# Patient Record
Sex: MALE | Race: WHITE | Employment: FULL TIME | URBAN - METROPOLITAN AREA
[De-identification: names, ages, dates, MRNs, and addresses within clinical notes are randomized per-mention and may not be internally consistent; named-entity substitution may affect disease eponyms.]

---

## 2017-01-26 ENCOUNTER — OFFICE VISIT (OUTPATIENT)
Dept: ORTHOPEDIC SURGERY | Age: 52
End: 2017-01-26

## 2017-01-26 VITALS
BODY MASS INDEX: 38.95 KG/M2 | HEIGHT: 70 IN | SYSTOLIC BLOOD PRESSURE: 113 MMHG | HEART RATE: 69 BPM | WEIGHT: 272.05 LBS | DIASTOLIC BLOOD PRESSURE: 76 MMHG

## 2017-01-26 DIAGNOSIS — Z98.890 S/P ROTATOR CUFF REPAIR: Primary | ICD-10-CM

## 2017-01-26 PROCEDURE — 99024 POSTOP FOLLOW-UP VISIT: CPT | Performed by: ORTHOPAEDIC SURGERY

## 2017-03-02 ENCOUNTER — OFFICE VISIT (OUTPATIENT)
Dept: ORTHOPEDIC SURGERY | Age: 52
End: 2017-03-02

## 2017-03-02 VITALS
WEIGHT: 272.05 LBS | SYSTOLIC BLOOD PRESSURE: 117 MMHG | HEART RATE: 67 BPM | DIASTOLIC BLOOD PRESSURE: 72 MMHG | HEIGHT: 70 IN | BODY MASS INDEX: 38.95 KG/M2

## 2017-03-02 DIAGNOSIS — Z98.890 S/P ROTATOR CUFF REPAIR: Primary | ICD-10-CM

## 2017-03-02 PROCEDURE — 99213 OFFICE O/P EST LOW 20 MIN: CPT | Performed by: ORTHOPAEDIC SURGERY

## 2017-03-30 ENCOUNTER — OFFICE VISIT (OUTPATIENT)
Dept: ORTHOPEDIC SURGERY | Age: 52
End: 2017-03-30

## 2017-03-30 VITALS
DIASTOLIC BLOOD PRESSURE: 64 MMHG | WEIGHT: 272 LBS | BODY MASS INDEX: 38.94 KG/M2 | SYSTOLIC BLOOD PRESSURE: 127 MMHG | HEIGHT: 70 IN | HEART RATE: 86 BPM

## 2017-03-30 DIAGNOSIS — Z98.890 S/P ROTATOR CUFF REPAIR: Primary | ICD-10-CM

## 2017-03-30 PROCEDURE — 99214 OFFICE O/P EST MOD 30 MIN: CPT | Performed by: ORTHOPAEDIC SURGERY

## 2017-03-30 RX ORDER — ADALIMUMAB 40MG/0.8ML
KIT SUBCUTANEOUS
COMMUNITY
Start: 2017-03-13 | End: 2021-10-05

## 2017-05-02 ENCOUNTER — OFFICE VISIT (OUTPATIENT)
Dept: ORTHOPEDIC SURGERY | Age: 52
End: 2017-05-02

## 2017-05-02 VITALS
BODY MASS INDEX: 38.95 KG/M2 | DIASTOLIC BLOOD PRESSURE: 79 MMHG | HEART RATE: 69 BPM | HEIGHT: 70 IN | WEIGHT: 272.05 LBS | SYSTOLIC BLOOD PRESSURE: 120 MMHG

## 2017-05-02 DIAGNOSIS — Z98.890 S/P ROTATOR CUFF REPAIR: Primary | ICD-10-CM

## 2017-05-02 PROCEDURE — 99213 OFFICE O/P EST LOW 20 MIN: CPT | Performed by: ORTHOPAEDIC SURGERY

## 2017-08-29 ENCOUNTER — OFFICE VISIT (OUTPATIENT)
Dept: ORTHOPEDIC SURGERY | Age: 52
End: 2017-08-29

## 2017-08-29 VITALS
WEIGHT: 280 LBS | DIASTOLIC BLOOD PRESSURE: 74 MMHG | BODY MASS INDEX: 40.09 KG/M2 | HEIGHT: 70 IN | SYSTOLIC BLOOD PRESSURE: 108 MMHG | HEART RATE: 61 BPM

## 2017-08-29 DIAGNOSIS — G56.01 CARPAL TUNNEL SYNDROME OF RIGHT WRIST: Primary | ICD-10-CM

## 2017-08-29 PROCEDURE — 99213 OFFICE O/P EST LOW 20 MIN: CPT | Performed by: ORTHOPAEDIC SURGERY

## 2017-09-19 ENCOUNTER — TELEPHONE (OUTPATIENT)
Dept: ORTHOPEDIC SURGERY | Age: 52
End: 2017-09-19

## 2017-09-29 ENCOUNTER — TELEPHONE (OUTPATIENT)
Dept: ORTHOPEDIC SURGERY | Age: 52
End: 2017-09-29

## 2017-10-10 ENCOUNTER — OFFICE VISIT (OUTPATIENT)
Dept: ORTHOPEDIC SURGERY | Age: 52
End: 2017-10-10

## 2017-10-10 VITALS
WEIGHT: 279.98 LBS | HEART RATE: 69 BPM | DIASTOLIC BLOOD PRESSURE: 57 MMHG | BODY MASS INDEX: 41.47 KG/M2 | HEIGHT: 69 IN | SYSTOLIC BLOOD PRESSURE: 84 MMHG

## 2017-10-10 DIAGNOSIS — Z98.890 S/P CARPAL TUNNEL RELEASE: Primary | ICD-10-CM

## 2017-10-10 PROCEDURE — 99024 POSTOP FOLLOW-UP VISIT: CPT | Performed by: ORTHOPAEDIC SURGERY

## 2017-10-10 NOTE — LETTER
[address]  Phone: [phone]  Fax: [phone]    Efren Gasca MD        October 17, 2017     [address] [place] [address]    Patient: Mayco Donnelly  MR Number: [id number]  YOB: 1965  Date of Visit: 10/10/2017    Dear Dr. Deepika Burciaga: Thank you for the request for consultation for Arabella Washington to me for the evaluation of ***. Below are the relevant portions of my assessment and plan of care. If you have questions, please do not hesitate to call me. I look forward to following Wade Senate along with you.     Sincerely,        Efren Gasca MD

## 2017-10-10 NOTE — PROGRESS NOTES
History of Present Illness:  Keiry Mike is a pleasant 47 y.o. male presenting today approximately 1 week status post right wrist carpal tunnel release performed on 10/2/2017. Overall he is doing well and has no major concerns at this time. Medical History:  Patient's medications, allergies, past medical, surgical, social and family histories were reviewed and updated as appropriate. Pertinent items are noted in HPI  Review of systems reviewed from Patient History Form dated on 08/29/2017 and available in the patient's chart under the Media tab. Vital Signs:  Vitals:    10/10/17 1028   BP: (!) 84/57   Pulse: 69         Constitutional: In no apparent distress. Normal affect. Alert and oriented X3 and is cooperative. Right Wrist Examination:    Inspection: Sutures intact. Incision healing well. No drainage from incision site. No concern for infection at this time. Palpation: Nontender with light touch. Active Range of Motion: Full range of motion    Passive Range of Motion:  Deferred    Strength:  Deferred    Special Tests: Distally neurovasularly intact. Assessment :  Keiry Mike is a pleasant 47 y.o. male presenting today approximately 1 week status post right wrist carpal tunnel release performed on 10/2/2017. He is doing well and his preoperative numbness and tingling has resolved. Impression:  Encounter Diagnosis   Name Primary?  S/P carpal tunnel release Yes       Office Procedures:  No orders of the defined types were placed in this encounter. Treatment Plan:  Keiry Mike will be doing physical therapy on his own. Sutures were removed today and Steri-Strips were placed over the incision site. He may return to work Tuesday, October 17, 2017; a return to work letter was provided to him. We will see him back in 3 weeks and/or as needed. All questions were answered to patient's satisfaction and was encouraged to call with any further questions or concerns. Maranda Krabbe is in agreement with this plan. Misael Qiu PA-C  10/10/2017     During this examination, I, Misael Qiu PA-C, functioned as a scribe for Dr. Bradley Cortez. The history taking and physical examination were performed by Dr. Bradley Cortez. All counseling during the appointment was performed between the patient and Dr. Bradley Cortez. 10/10/2017 6:12 PM      This dictation was performed with a verbal recognition program (DRAGON) and it was checked for errors. It is possible that there are still dictated errors within this office note. If so, please bring any errors to my attention for an addendum. All efforts were made to ensure that this office note is accurate.  _____________  I, Dr. Mitch Garcia, personally performed the services described in this documentation as described by Misael Qiu PA-C in my presence, and it is both accurate and complete. Chiara Cortez MD, PhD  10/10/2017

## 2018-10-29 ENCOUNTER — OFFICE VISIT (OUTPATIENT)
Dept: ORTHOPEDIC SURGERY | Age: 53
End: 2018-10-29
Payer: COMMERCIAL

## 2018-10-29 VITALS
HEIGHT: 69 IN | SYSTOLIC BLOOD PRESSURE: 138 MMHG | BODY MASS INDEX: 40.88 KG/M2 | HEART RATE: 60 BPM | DIASTOLIC BLOOD PRESSURE: 82 MMHG | WEIGHT: 276 LBS

## 2018-10-29 DIAGNOSIS — M25.511 RIGHT SHOULDER PAIN, UNSPECIFIED CHRONICITY: Primary | ICD-10-CM

## 2018-10-29 DIAGNOSIS — S46.211A BICEPS RUPTURE, PROXIMAL, RIGHT, INITIAL ENCOUNTER: ICD-10-CM

## 2018-10-29 DIAGNOSIS — M75.101 TEAR OF RIGHT ROTATOR CUFF, UNSPECIFIED TEAR EXTENT: ICD-10-CM

## 2018-10-29 PROCEDURE — G8428 CUR MEDS NOT DOCUMENT: HCPCS | Performed by: PHYSICIAN ASSISTANT

## 2018-10-29 PROCEDURE — G8484 FLU IMMUNIZE NO ADMIN: HCPCS | Performed by: PHYSICIAN ASSISTANT

## 2018-10-29 PROCEDURE — 1036F TOBACCO NON-USER: CPT | Performed by: PHYSICIAN ASSISTANT

## 2018-10-29 PROCEDURE — G8417 CALC BMI ABV UP PARAM F/U: HCPCS | Performed by: PHYSICIAN ASSISTANT

## 2018-10-29 PROCEDURE — 99214 OFFICE O/P EST MOD 30 MIN: CPT | Performed by: PHYSICIAN ASSISTANT

## 2018-10-29 PROCEDURE — 3017F COLORECTAL CA SCREEN DOC REV: CPT | Performed by: PHYSICIAN ASSISTANT

## 2018-10-29 ASSESSMENT — ENCOUNTER SYMPTOMS: SHORTNESS OF BREATH: 1

## 2018-10-29 NOTE — PROGRESS NOTES
muscle deformity, positive Nazia's    Neurovascular: Sensation to light touch is intact, no motor deficits, palpable radial pulses 2+    Radiology:     Plain radiographs of the right shoulder comprising 3 views: AP and an axillary lateral were obtained and reviewed in the office: He does have a well-preserved glenohumeral joint space. There is upper migration of the humeral head. There is arthritic changes of the a.c. joint. Assessment :  Mr. Drea Vergara is a 46 y.o. yr old patient with acute right shoulder pain related to a proximal biceps rupture along with rotator cuff dysfunction. Impression:  Encounter Diagnoses   Name Primary?  Right shoulder pain, unspecified chronicity Yes    Biceps rupture, proximal, right, initial encounter     Tear of right rotator cuff, unspecified tear extent        Office Procedures:  Orders Placed This Encounter   Procedures    XR SHOULDER RIGHT (MIN 2 VIEWS)    MRI SHOULDER RIGHT WO CONTRAST     Standing Status:   Future     Standing Expiration Date:   10/29/2019     Order Specific Question:   Reason for exam:     Answer:   proximal biceps rupture and possible rotator cuff tear. Treatment Plan: At this time we would like to obtain an MRI of his right shoulder to evaluate the biceps tendon along with his rotator cuff to evaluate for any tears and the extent of tears. We will try and facilitate this as quickly as possible and bring him back to the office to review the results of the MRI. All his questions were answered today. He was agreeable to the above plan. 25 minutes was spent discussing his diagnosis and treatment options today. 9:41 AM      Jeanna Ma PA-C  Orthopaedic Sports Medicine Physician Assistant      This dictation was performed with a verbal recognition program [facility]) and it was checked for errors. It is possible that there are still dictated errors within this office note.   If so, please bring any errors to my attention for an addendum. All efforts were made to ensure that this office note is accurate.

## 2019-07-01 ENCOUNTER — OFFICE VISIT (OUTPATIENT)
Dept: ORTHOPEDIC SURGERY | Age: 54
End: 2019-07-01
Payer: COMMERCIAL

## 2019-07-01 VITALS
HEIGHT: 70 IN | DIASTOLIC BLOOD PRESSURE: 66 MMHG | WEIGHT: 293 LBS | BODY MASS INDEX: 41.95 KG/M2 | HEART RATE: 60 BPM | SYSTOLIC BLOOD PRESSURE: 125 MMHG

## 2019-07-01 DIAGNOSIS — M79.641 RIGHT HAND PAIN: ICD-10-CM

## 2019-07-01 DIAGNOSIS — S62.644A CLOSED NONDISPLACED FRACTURE OF PROXIMAL PHALANX OF RIGHT RING FINGER, INITIAL ENCOUNTER: Primary | ICD-10-CM

## 2019-07-01 PROCEDURE — G8417 CALC BMI ABV UP PARAM F/U: HCPCS | Performed by: ORTHOPAEDIC SURGERY

## 2019-07-01 PROCEDURE — 3017F COLORECTAL CA SCREEN DOC REV: CPT | Performed by: ORTHOPAEDIC SURGERY

## 2019-07-01 PROCEDURE — 99204 OFFICE O/P NEW MOD 45 MIN: CPT | Performed by: ORTHOPAEDIC SURGERY

## 2019-07-01 PROCEDURE — G8427 DOCREV CUR MEDS BY ELIG CLIN: HCPCS | Performed by: ORTHOPAEDIC SURGERY

## 2019-07-01 PROCEDURE — 1036F TOBACCO NON-USER: CPT | Performed by: ORTHOPAEDIC SURGERY

## 2019-07-01 NOTE — PROGRESS NOTES
Diagnosis Date Noted    No Resolved Ambulatory Problems     Past Medical History:   Diagnosis Date    Arthritis     Diabetes ([address]     Heart disease     Hypertension        Review of Systems:  Rk Dumont's reported review of systems has been reviewed and has been scanned into his medical record for today's visit. The scanned image can be found in media images folder. He was instructed to contact his primary care physician regarding ROS positives if not already being addressed during today's visit. Height: 5' 9.5\" (176.5 cm), Weight: 293 lb (132.9 kg), Body mass index is 42.65 kg/m².,  Relevant review of systems reviewed and available in the patient's chart. All pertinent systems are reviewed and positives noted. Vital Signs:  Vitals:    07/01/19 1602   BP: 125/66   Pulse: 60     Height: 5' 9.5\" (176.5 cm), Weight: 293 lb (132.9 kg), Body mass index is 42.65 kg/m². ,      Physical Exam:     Appearance: Brian Morgan is alert, oriented x 3, in no apparent distress, and well groomed. Right hand Exam:    Inspection/Skin: Splint was removed. His skin is clean dry and intact no evidence of abrasions or lacerations. There is some swelling over the middle finger and ring finger    Palpation: Tenderness to palpation over the proximal phalanx of the ring finger and middle finger near the PIP joint    Range of Motion: Range of motion is intact with flexion extension at all digits at the MCP, PIP and DIP joints although limited secondary to pain and guarding of the ring finger    Strength: Deferred    Stability: No instability with collateral ligamentous testing    Special Testing: DIP flexion extension is intact in all digits. Neurovascular: Motor and sensory is grossly intact in the median, ulnar, radial, AIN, and PIN. Capillary refill is less than 2 seconds in the ring finger and middle finger.       Office Procedures:  No orders of the defined types were placed in this encounter. Assessment:  Right ring finger and middle finger nondisplaced closed proximal phalanx fractures, injury date 6/29/2019      Treatment Plan:    I discussed with the patient treatment options. Recommend he kiana tape his finger of the ring finger to the small finger and middle finger to the index finger and showed him how to do this. I encouraged gentle range of motion of his fingers while in the kiana tape. He should follow-up in 2 weeks with [facility] for a clinical exam and to make sure he is healing and doing well. If he is then he can kiana tape for comfort and follow up PRN after that. Marcus Witt. Liliana Hazel, [address] [place] [address] Sports Medicine and [address] [place] [address]     The encounter with Darlin Escobar was supervised by Dr. Zak Guy who personally examined the patient and reviewed the plan. This dictation was performed with a verbal recognition program (DRAGON) and it was checked for errors. It is possible that there are still dictated errors within this office note. If so, please bring any errors to my attention for an addendum. All efforts were made to ensure that this office note is accurate. I supervised my sports medicine fellow in the evaluation and development of a treatment plan  for this patient. I personally interviewed the patient and performed the physical examination. In addition, I discussed the diagnosis and treatment options. All of the patient's questions were answered. I personally reviewed the patient's pain scale, review of systems, family history, social history, past medical history, allergies and medications. Review of systems was collected today, reviewed and is included in the medical record. It is available under the media tab. Geneva Ascencio MD  Sports Medicine, Arthroscopic Knee and Shoulder Surgery    This dictation was performed with a verbal recognition program [facility] [place]) and it was checked for errors.   It is

## 2019-07-22 ENCOUNTER — OFFICE VISIT (OUTPATIENT)
Dept: ORTHOPEDIC SURGERY | Age: 54
End: 2019-07-22
Payer: COMMERCIAL

## 2019-07-22 VITALS
DIASTOLIC BLOOD PRESSURE: 76 MMHG | BODY MASS INDEX: 43.4 KG/M2 | HEIGHT: 69 IN | SYSTOLIC BLOOD PRESSURE: 124 MMHG | WEIGHT: 293 LBS | HEART RATE: 66 BPM

## 2019-07-22 DIAGNOSIS — S62.644A CLOSED NONDISPLACED FRACTURE OF PROXIMAL PHALANX OF RIGHT RING FINGER, INITIAL ENCOUNTER: Primary | ICD-10-CM

## 2019-07-22 PROCEDURE — G8417 CALC BMI ABV UP PARAM F/U: HCPCS | Performed by: ORTHOPAEDIC SURGERY

## 2019-07-22 PROCEDURE — G8427 DOCREV CUR MEDS BY ELIG CLIN: HCPCS | Performed by: ORTHOPAEDIC SURGERY

## 2019-07-22 PROCEDURE — 99213 OFFICE O/P EST LOW 20 MIN: CPT | Performed by: ORTHOPAEDIC SURGERY

## 2019-07-22 PROCEDURE — 1036F TOBACCO NON-USER: CPT | Performed by: ORTHOPAEDIC SURGERY

## 2019-07-22 PROCEDURE — 3017F COLORECTAL CA SCREEN DOC REV: CPT | Performed by: ORTHOPAEDIC SURGERY

## 2019-07-22 NOTE — PROGRESS NOTES
Chief Complaint  Follow-up (right hand / finger. pt states that he is better than he was at previous visit, but is still having issues with straightening his finger. )      History of Present Illness:  Asher Serrano is a pleasant 46 y.o. male who is here today for follow up evaluation of their right ring finger. He suffered a proximal phalanx fracture of his right ring finger on 6/29/19. He states his pain is better but he is still has some stiffness and is unable to straighten his finger. He denies any new injuries. Medical History:  Patient's medications, allergies, past medical, surgical, social and family histories were reviewed and updated as appropriate. Pertinent items are noted in HPI  Review of systems reviewed from Patient History Form completed today and available in the patient's chart under the Media tab. Pain Assessment  Location of Pain: Hand  Location Modifiers: Right  Severity of Pain: 2  Quality of Pain: Aching  Duration of Pain: Persistent  Frequency of Pain: Constant  Aggravating Factors: Straightening  Limiting Behavior: Yes  Relieving Factors: Rest  Result of Injury: Yes  Work-Related Injury: No  Are there other pain locations you wish to document?: No    Past Medical History:   Diagnosis Date    Arthritis     Diabetes ([facility] [address]     Heart disease     Hypertension         Past Surgical History:   Procedure Laterality Date    CARPAL TUNNEL RELEASE      CORONARY ANGIOPLASTY WITH STENT PLACEMENT      HERNIA REPAIR         History reviewed. No pertinent family history.     Social History     Socioeconomic History    Marital status:      Spouse name: None    Number of children: None    Years of education: None    Highest education level: None   Occupational History    None   Social Needs    Financial resource strain: None    Food insecurity:     Worry: None     Inability: None    Transportation needs:     Medical: None     Non-medical: None   Tobacco Use    Smoking Procedures    XR FINGER RIGHT (MIN 2 VIEWS)     Standing Status:   Future     Number of Occurrences:   1     Standing Expiration Date:   7/22/2020         Plan: Pertinent imaging was reviewed. The etiology, natural history, and treatment options for the disorder were discussed. The roles of activity medication, antiinflammatories, injections, bracing, physical therapy, and surgical interventions were all described to the patient and questions were answered. I believe his finger is healing appropriately. He can continue to work on range of motion at home. I will see him back in 1 month for reevaluation with new X-rays, sooner if. Riddhi Oakley is in agreement with this plan. All questions were answered to patient's satisfaction and was encouraged to call with any further questions. Papa Gonzalez ATC, am scribing for and in the presence of Dr. Lora Tuttle. 07/22/19 3:03 PM Danita Hoyt ATC          I personally reviewed the patient's pain scale, review of systems, family history, social history, past medical history, allergies and medications. Review of systems was collected today, reviewed and is included in the medical record. It is available under the media tab. I personally performed the services described in this documentation and scribed by Danita Hoyt ATC. Teja Perry MD  Sports Medicine, Arthroscopic Knee and Shoulder Surgery    This dictation was performed with a verbal recognition program [facility]) and it was checked for errors. It is possible that there are still dictated errors within this office note. If so, please bring any errors to my attention for an addendum. All efforts were made to ensure that this office note is accurate.

## 2019-11-22 NOTE — LETTER
[address]  Phone: [phone]  Fax: [phone]    Ciarra Lopez      October 10, 2017     Patient: Candie Godfrey   YOB: 1965   Date of Visit: 10/10/2017       To Whom It May Concern: It is my medical opinion that Sania Riggs may return to work on Tuesday, October 17, 2017. If you have any questions or concerns, please don't hesitate to call.     Sincerely,          [address] [facility]  10/10/2017 I personally performed the service described in the documentation recorded by the scribe in my presence, and it accurately and completely records my words and actions.

## 2020-10-01 ENCOUNTER — OFFICE VISIT (OUTPATIENT)
Dept: ORTHOPEDIC SURGERY | Age: 55
End: 2020-10-01
Payer: COMMERCIAL

## 2020-10-01 VITALS — TEMPERATURE: 97.6 F

## 2020-10-01 PROCEDURE — 1036F TOBACCO NON-USER: CPT | Performed by: PHYSICIAN ASSISTANT

## 2020-10-01 PROCEDURE — G8421 BMI NOT CALCULATED: HCPCS | Performed by: PHYSICIAN ASSISTANT

## 2020-10-01 PROCEDURE — 99213 OFFICE O/P EST LOW 20 MIN: CPT | Performed by: PHYSICIAN ASSISTANT

## 2020-10-01 PROCEDURE — 3017F COLORECTAL CA SCREEN DOC REV: CPT | Performed by: PHYSICIAN ASSISTANT

## 2020-10-01 PROCEDURE — G8428 CUR MEDS NOT DOCUMENT: HCPCS | Performed by: PHYSICIAN ASSISTANT

## 2020-10-01 PROCEDURE — G8484 FLU IMMUNIZE NO ADMIN: HCPCS | Performed by: PHYSICIAN ASSISTANT

## 2020-10-01 PROCEDURE — 20610 DRAIN/INJ JOINT/BURSA W/O US: CPT | Performed by: PHYSICIAN ASSISTANT

## 2020-10-01 RX ORDER — CYCLOBENZAPRINE HCL 10 MG
10 TABLET ORAL 3 TIMES DAILY PRN
Qty: 90 TABLET | Refills: 0 | Status: SHIPPED | OUTPATIENT
Start: 2020-10-01 | End: 2020-10-31

## 2020-10-01 NOTE — LETTER
[facility]  Surgery Scheduling Form:    DEMOGRAPHICS:                                                                                                                Patient Name:  Janene Moore  Patient :  1965   Patient SS#:      Patient Phone:  [phone] (home) [phone] (work)   Patient Address:  [address] [place] [id number]    PCP:  [facility]:   Payor/Plan Subscr  Sex Relation Sub. Ins. ID Effective Group Num   1. GENERIC SELF-* ELSY GERARD 1965 Male  [id number] [address] [facility] [address], [facility] [id number]   2. [address] [facility] 1965 Male  [id number] 10/18/19 [id number]                                   [address] [address]     DIAGNOSIS & PROCEDURE:                                                                                              Diagnosis:   Right Hip arthritis    M16.11  Operation:  Right Hip injection under fluoro    [id number]  Location:  [place]  Surgeon:  Cindy Sanchez. Dereje Clayton MD    SCHEDULING INFORMATION:                                                                                         .    Surgeon's Scheduling Instruction: 3.5cc of injectable 0.25%Marcaine and 1.5cc of injectable Kenalog 40 mg    Requested Date:  10-15  OR Time:  12:00 Patient Arrival Time:  11:30    OR Time Required:     Anesthesia:    Equipment:    Mini C-Arm:     Standard C-Arm:    Status: outpatient  PAT Required:    Comments:                      Yonis Garnett.  Isis Car MD      10/1/20  BILLING INFORMATION:                                                                                                     Procedure:       CPT Code Modifier  17377  24543  Right Hip injection under fluoro                            Pre-Certification:

## 2020-10-03 NOTE — PROGRESS NOTES
This dictation was done with [employer] dictation and may contain mechanical errors related to translation. The review of systems was currently provided by the patient and reviewed with the medical assistant at today's visit. Please see media. Subjective:  Honey Baxter is a 53 y.o. who is here complaining of pain in his right hip this pain is really increased over the last 4 months but especially the last 2 weeks. Obvious pain is come up in the buttock area toward the trochanteric area. He denies any pain going into the groin area. It is aggravated by sitting and walking for prolonged periods of time. He denies any numbness or tingling or back pain. When it is bad he says the pain is a 9 out of 10. He is on a blood thinner and has stents and is concerned that he is able to have surgery. He was sent for x-rays at the office today including an AP pelvis and a 2 view right hip.       Patient Active Problem List   Diagnosis    Benign neoplasm of skin    Basal cell papilloma    Seborrheic eczema           Current Outpatient Medications on File Prior to Visit   Medication Sig Dispense Refill    HUMIRA PEN-PSORIASIS STARTER 40 MG/0.8ML injection       VENTOLIN  (90 BASE) MCG/ACT inhaler       cetirizine (ZYRTEC) 10 MG tablet Take 10 mg by mouth      Dapagliflozin-Metformin HCl ER (XIGDUO XR) 5-1000 MG TB24 Take 1 tablet by mouth      furosemide (LASIX) 20 MG tablet Take 20 mg by mouth      hydrochlorothiazide (HYDRODIURIL) 25 MG tablet Take 25 mg by mouth      isosorbide mononitrate (IMDUR) 120 MG extended release tablet Take 120 mg by mouth      meclizine (ANTIVERT) 25 MG tablet Take 25 mg by mouth      metoprolol succinate (TOPROL XL) 25 MG extended release tablet Take 25 mg by mouth      MELOXICAM PO Take by mouth      Prasugrel HCl (EFFIENT PO) Take by mouth      ranolazine (RANEXA) 500 MG extended release tablet Take 500 mg by mouth 2 times daily      PRAVASTATIN SODIUM PO Take by mouth      Omega-3 Fatty Acids (FISH OIL PO) Take by mouth      Omeprazole (PRILOSEC PO) Take by mouth      aspirin 81 MG tablet Take 81 mg by mouth daily       No current facility-administered medications on file prior to visit. Objective:   Temperature 97.6 °F (36.4 °C), temperature source Temporal.    On examination this is actually a pleasant 58-year-old gentleman in no acute distress she is alert and oriented x3. He is able to walk without antalgia has no significant increase in pain on straight leg raise with trunk extension. Distal pulses and reflexes are intact. He does have pain on palpation of the greater trochanteric area up into the myofascial area posterior hip but not in the sciatic area or across the lumbosacral area. He does have some weakness to hip abduction but has fair hip flexion. He has good distal pulses with good dorsiflexion and plantarflexion strength. Neuro exam grossly intact both lower extremities. Intact sensation to light touch. Motor exam 4+ to 5/5 in all major motor groups. Negative Chung's sign. Skin is warm, dry and intact with out erythema or significant increased temperature around the knee joint(s). There are no cutaneous lesions or lymphadenopathy present. X-RAYS:  X-rays taken the office today do show some mild hip osteoarthritis of both hips with some joint space loss and subchondral sclerosis but no significant malformation of the femoral head or fractures were noted and this was shown to the patient      Assessment:  Hip bursitis with muscle strain and mild osteoarthritis of the right hip    Plan:  During today's visit, there was approximately 20 minutes of face-to-face discussion in regards to the patient's current condition and treatment options. More than 50 % of the time was counseling and coordination of care.       Document short and long-term expectations and initially we will do an injection to get rid of some of the soreness get him on

## 2020-10-15 ENCOUNTER — OFFICE VISIT (OUTPATIENT)
Dept: ORTHOPEDIC SURGERY | Age: 55
End: 2020-10-15
Payer: COMMERCIAL

## 2020-10-15 ENCOUNTER — HOSPITAL ENCOUNTER (OUTPATIENT)
Dept: GENERAL RADIOLOGY | Age: 55
Discharge: HOME | End: 2020-10-15
Payer: COMMERCIAL

## 2020-10-15 PROCEDURE — 77002 NEEDLE LOCALIZATION BY XRAY: CPT

## 2020-10-15 PROCEDURE — 6360000002 HC RX W HCPCS

## 2020-10-15 PROCEDURE — 2500000003 HC RX 250 WO HCPCS

## 2020-10-15 PROCEDURE — 20610 DRAIN/INJ JOINT/BURSA W/O US: CPT

## 2020-10-15 NOTE — PROGRESS NOTES
After obtaining the patient's consent, the patient was placed supine on the fluoroscopy table. The Right groin was prepped with betadine. The skin was infiltrated with 5 cc's of Marcaine. After local anesthesia was achieved, the hip was visualized under fluoroscopic guidance. The needle was placed anteriorly into the   Right hip joint and aspiration was performed. No blood or joint fluid was aspirated. Then 5 cc's of Kenalog was injected into the Right hip joint. The needle was removed and a bandage was applied. The patient tolerated the procedure without any difficulty. The patient was injected for degenerative arthritis of the hip.

## 2020-11-19 ENCOUNTER — TELEPHONE (OUTPATIENT)
Dept: ORTHOPEDIC SURGERY | Age: 55
End: 2020-11-19

## 2020-12-03 ENCOUNTER — OFFICE VISIT (OUTPATIENT)
Dept: ORTHOPEDIC SURGERY | Age: 55
End: 2020-12-03
Payer: COMMERCIAL

## 2020-12-03 VITALS — HEIGHT: 69 IN | WEIGHT: 293 LBS | TEMPERATURE: 97.2 F | RESPIRATION RATE: 18 BRPM | BODY MASS INDEX: 43.4 KG/M2

## 2020-12-03 PROCEDURE — G8417 CALC BMI ABV UP PARAM F/U: HCPCS | Performed by: PHYSICIAN ASSISTANT

## 2020-12-03 PROCEDURE — G8427 DOCREV CUR MEDS BY ELIG CLIN: HCPCS | Performed by: PHYSICIAN ASSISTANT

## 2020-12-03 PROCEDURE — 3017F COLORECTAL CA SCREEN DOC REV: CPT | Performed by: PHYSICIAN ASSISTANT

## 2020-12-03 PROCEDURE — 1036F TOBACCO NON-USER: CPT | Performed by: PHYSICIAN ASSISTANT

## 2020-12-03 PROCEDURE — G8484 FLU IMMUNIZE NO ADMIN: HCPCS | Performed by: PHYSICIAN ASSISTANT

## 2020-12-03 PROCEDURE — 99213 OFFICE O/P EST LOW 20 MIN: CPT | Performed by: PHYSICIAN ASSISTANT

## 2020-12-03 NOTE — PROGRESS NOTES
mononitrate (IMDUR) 120 MG extended release tablet Take 120 mg by mouth      meclizine (ANTIVERT) 25 MG tablet Take 25 mg by mouth      metoprolol succinate (TOPROL XL) 25 MG extended release tablet Take 25 mg by mouth      MELOXICAM PO Take by mouth      Prasugrel HCl (EFFIENT PO) Take by mouth      ranolazine (RANEXA) 500 MG extended release tablet Take 500 mg by mouth 2 times daily      PRAVASTATIN SODIUM PO Take by mouth      Omega-3 Fatty Acids (FISH OIL PO) Take by mouth      Omeprazole (PRILOSEC PO) Take by mouth      aspirin 81 MG tablet Take 81 mg by mouth daily       No current facility-administered medications on file prior to visit. Objective:   Temperature 97.2 °F (36.2 °C), resp. rate 18, height 5' 9\" (1.753 m), weight 293 lb (132.9 kg). On examination today this is a pleasant 76-year-old gentleman who is alert and oriented x3 he has limited range of motion with both hips has pain with internal and external rotation mainly on the right but to some extent on the left. He does have a positive straight leg raise on the right and some radicular pain into the L3-L4 dermatome. He has fair hip flexion and abduction strength he can walk without antalgia. He has good distal pulses good dorsiflexion and plantarflexion strength. Neuro exam grossly intact both lower extremities. Intact sensation to light touch. Motor exam 4+ to 5/5 in all major motor groups. Negative Chung's sign. Skin is warm, dry and intact with out erythema or significant increased temperature around the knee joint(s). There are no cutaneous lesions or lymphadenopathy present. X-RAYS:  The x-rays taken at the office today show pretty healthy curve with some early degenerative osteoarthritis in the lumbar spine. There is some slight joint space decrease around L3 and L4 butno specific spondylosis or spondylolisthesis.   No obvious fracture was seen there is a calcified vessel anterior to the spine      Assessment:  Right hip osteoarthritis with right L3 radiculopathy    Plan:  During today's visit, there was approximately 20 minutes of face-to-face discussion in regards to the patient's current condition and treatment options. More than 50 % of the time was counseling and coordination of care as indicated above.   We talked about short and long-term expectations and initially I will send him for an MRI of his lower back to help guide further therapeutic treatment      PROCEDURE NOTE:   Order an MRI      They will schedule a follow up in after MRI

## 2020-12-09 ENCOUNTER — TELEPHONE (OUTPATIENT)
Dept: ORTHOPEDIC SURGERY | Age: 55
End: 2020-12-09

## 2020-12-09 NOTE — TELEPHONE ENCOUNTER
Prescription Refill     Medication Name:  Patient is wanting something called in for pain.    Pharmacy: [facility] pharmacy #[phone]  Patient Contact Number:  [phone]

## 2020-12-11 NOTE — TELEPHONE ENCOUNTER
General Question     Subject: INFLAMMATION / STEROID MED   Patient: ELSY  Contact Number: [phone]  [facility] [phone]  FOLLOWING UP ON PREVIOUS MESSAGE.

## 2021-07-23 ENCOUNTER — HOSPITAL ENCOUNTER (EMERGENCY)
Age: 56
LOS: 1 days | Discharge: HOME | End: 2021-07-24
Payer: COMMERCIAL

## 2021-07-23 VITALS
RESPIRATION RATE: 15 BRPM | DIASTOLIC BLOOD PRESSURE: 98 MMHG | SYSTOLIC BLOOD PRESSURE: 164 MMHG | OXYGEN SATURATION: 99 % | HEART RATE: 88 BPM

## 2021-07-23 VITALS
TEMPERATURE: 98.9 F | SYSTOLIC BLOOD PRESSURE: 165 MMHG | HEART RATE: 91 BPM | DIASTOLIC BLOOD PRESSURE: 90 MMHG | RESPIRATION RATE: 18 BRPM | OXYGEN SATURATION: 100 %

## 2021-07-23 DIAGNOSIS — I25.10: ICD-10-CM

## 2021-07-23 DIAGNOSIS — Z95.5: ICD-10-CM

## 2021-07-23 DIAGNOSIS — I45.10: ICD-10-CM

## 2021-07-23 DIAGNOSIS — Z79.84: ICD-10-CM

## 2021-07-23 DIAGNOSIS — E11.9: ICD-10-CM

## 2021-07-23 DIAGNOSIS — Z79.899: ICD-10-CM

## 2021-07-23 DIAGNOSIS — I48.91: ICD-10-CM

## 2021-07-23 DIAGNOSIS — R00.2: Primary | ICD-10-CM

## 2021-07-23 LAB
ADD MANUAL DIFF: NO
ANION GAP: 11 MMOL/L (ref 8–16)
BASIC METABOLIC AND HEMATOCRIT PNL BLD: 46.6 % (ref 42–52)
BLOOD UREA NITROGEN: 16 MG/DL (ref 9–20)
BUN SERPL-MCNC: 16 MG/DL (ref 9–20)
CALCIUM: 8.9 MG/DL (ref 8.4–10.2)
CARBON DIOXIDE: 22 MMOL/L (ref 22–30)
CHLORIDE SPEC-MCNC: 106 MMOL/L (ref 98–107)
CHLORIDE: 106 MMOL/L (ref 98–107)
CREAT CL PREDICTED SERPL C-G-VRATE: 137 ML/MIN
DELTA APTT PPP: 25.5 SECONDS (ref 22.3–36.8)
ESTIMATED CRCL CALCULATION: 137 ML/MIN
ESTIMATED GLOMERULAR FILT RATE: > 60
GFRSERPLBLD MDRD-ARVRAT: > 60 ML/MIN/{1.73_M2}
GLUCOSE SERPL-MCNC: 174 MG/DL (ref 65–110)
GLUCOSE: 174 MG/DL (ref 65–110)
HEMATOCRIT: 46.6 % (ref 42–52)
HEMOGLOBIN: 15.7 G/DL (ref 14–18)
HGB+HCT PNL BLD: 46.6 % (ref 42–52)
IMM GRANULOCYTES NFR BLD AUTO: 0.7 % (ref 0–0.5)
INR: 0.9
IPF CHARGE: (no result)
LYMPHOCYTES # SPEC AUTO: 3.16 K/MM3 (ref 0.9–3.2)
MCV RBC: 87.3 FL (ref 80–100)
MEAN CORPUSCULAR HEMOGLOBIN: 29.4 PG (ref 26–34)
MEAN CORPUSCULAR HGB CONC: 33.7 G/DL (ref 32–36)
NUCLEATED RED BLOOD CELLS ABSOLUTE AUTO: 0 K/MM3 (ref 0–0.01)
NUCLEATED RED BLOOD CELLS PERC: 0 % (ref 0–0.2)
PARTIAL THROMBOPLASTIN TIME: 25.5 SECONDS (ref 22.3–36.8)
PLATELET COUNT RESULT: 225 K/MM3 (ref 150–375)
POTASSIUM: 3.7 MMOL/L (ref 3.4–5)
PROTHROMBIN TIME: 12.1 SECONDS (ref 11.1–14.7)
RED BLOOD COUNT: 5.34 M/MM3 (ref 4.6–6.2)
SODIUM: 139 MMOL/L (ref 137–145)
TROPONIN I: < 0.012 NG/ML (ref 0–0.03)
WHITE BLOOD COUNT: 8.8 K/MM3 (ref 4.5–10)

## 2021-07-23 PROCEDURE — 84484 ASSAY OF TROPONIN QUANT: CPT

## 2021-07-23 PROCEDURE — 71046 X-RAY EXAM CHEST 2 VIEWS: CPT

## 2021-07-23 PROCEDURE — A9270 NON-COVERED ITEM OR SERVICE: HCPCS

## 2021-07-23 PROCEDURE — 85730 THROMBOPLASTIN TIME PARTIAL: CPT

## 2021-07-23 PROCEDURE — 85025 COMPLETE CBC W/AUTO DIFF WBC: CPT

## 2021-07-23 PROCEDURE — 93005 ELECTROCARDIOGRAM TRACING: CPT

## 2021-07-23 PROCEDURE — 85610 PROTHROMBIN TIME: CPT

## 2021-07-23 PROCEDURE — 99284 EMERGENCY DEPT VISIT MOD MDM: CPT

## 2021-07-23 PROCEDURE — 80048 BASIC METABOLIC PNL TOTAL CA: CPT

## 2021-07-23 PROCEDURE — 85380 FIBRIN DEGRADJ D-DIMER: CPT

## 2021-07-23 PROCEDURE — 36415 COLL VENOUS BLD VENIPUNCTURE: CPT

## 2021-07-24 VITALS
RESPIRATION RATE: 18 BRPM | SYSTOLIC BLOOD PRESSURE: 143 MMHG | OXYGEN SATURATION: 97 % | DIASTOLIC BLOOD PRESSURE: 84 MMHG | HEART RATE: 87 BPM

## 2021-07-24 VITALS
DIASTOLIC BLOOD PRESSURE: 102 MMHG | HEART RATE: 89 BPM | OXYGEN SATURATION: 99 % | SYSTOLIC BLOOD PRESSURE: 141 MMHG | RESPIRATION RATE: 18 BRPM

## 2021-07-24 LAB — TROPONIN I: < 0.012 NG/ML (ref 0–0.03)

## 2021-10-05 ENCOUNTER — OFFICE VISIT (OUTPATIENT)
Dept: ORTHOPEDIC SURGERY | Age: 56
End: 2021-10-05
Payer: COMMERCIAL

## 2021-10-05 VITALS — WEIGHT: 293 LBS | HEIGHT: 69 IN | BODY MASS INDEX: 43.4 KG/M2

## 2021-10-05 DIAGNOSIS — M25.511 RIGHT SHOULDER PAIN, UNSPECIFIED CHRONICITY: Primary | ICD-10-CM

## 2021-10-05 DIAGNOSIS — M75.101 NONTRAUMATIC TEAR OF RIGHT ROTATOR CUFF, UNSPECIFIED TEAR EXTENT: ICD-10-CM

## 2021-10-05 PROCEDURE — 20610 DRAIN/INJ JOINT/BURSA W/O US: CPT | Performed by: ORTHOPAEDIC SURGERY

## 2021-10-05 PROCEDURE — 99214 OFFICE O/P EST MOD 30 MIN: CPT | Performed by: ORTHOPAEDIC SURGERY

## 2021-10-05 RX ORDER — APIXABAN 5 MG/1
TABLET, FILM COATED ORAL
COMMUNITY
Start: 2021-08-09

## 2021-10-05 RX ORDER — SOTALOL HYDROCHLORIDE 80 MG/1
TABLET ORAL
COMMUNITY
Start: 2021-10-01

## 2021-10-05 RX ORDER — FUROSEMIDE 40 MG/1
TABLET ORAL
COMMUNITY
Start: 2021-10-01

## 2021-10-05 RX ORDER — LIRAGLUTIDE 6 MG/ML
INJECTION SUBCUTANEOUS
COMMUNITY
Start: 2021-10-01

## 2021-10-05 RX ORDER — PRASUGREL 10 MG/1
TABLET, FILM COATED ORAL
COMMUNITY
Start: 2021-10-01

## 2021-10-05 RX ORDER — SPIRONOLACTONE 25 MG/1
TABLET ORAL
COMMUNITY
Start: 2021-10-01

## 2021-10-05 RX ORDER — METHYLPREDNISOLONE ACETATE 40 MG/ML
80 INJECTION, SUSPENSION INTRA-ARTICULAR; INTRALESIONAL; INTRAMUSCULAR; SOFT TISSUE ONCE
Status: COMPLETED | OUTPATIENT
Start: 2021-10-05 | End: 2021-10-05

## 2021-10-05 RX ORDER — LIDOCAINE 50 MG/G
PATCH TOPICAL
COMMUNITY
Start: 2021-08-23

## 2021-10-05 RX ORDER — EMPAGLIFLOZIN 25 MG/1
TABLET, FILM COATED ORAL
COMMUNITY
Start: 2021-10-01

## 2021-10-05 RX ORDER — PANTOPRAZOLE SODIUM 40 MG/1
TABLET, DELAYED RELEASE ORAL
COMMUNITY
Start: 2021-09-28

## 2021-10-05 RX ORDER — NITROGLYCERIN 0.4 MG/1
TABLET SUBLINGUAL
COMMUNITY
Start: 2021-07-27

## 2021-10-05 RX ORDER — PRAVASTATIN SODIUM 20 MG
TABLET ORAL
COMMUNITY
Start: 2021-10-01

## 2021-10-05 RX ORDER — BLOOD SUGAR DIAGNOSTIC
STRIP MISCELLANEOUS
COMMUNITY
Start: 2021-07-01

## 2021-10-05 RX ORDER — HYDROCODONE BITARTRATE AND ACETAMINOPHEN 5; 325 MG/1; MG/1
TABLET ORAL
COMMUNITY
Start: 2021-10-01

## 2021-10-05 RX ORDER — LIDOCAINE HYDROCHLORIDE 10 MG/ML
8 INJECTION, SOLUTION INFILTRATION; PERINEURAL ONCE
Status: COMPLETED | OUTPATIENT
Start: 2021-10-05 | End: 2021-10-05

## 2021-10-05 RX ADMIN — LIDOCAINE HYDROCHLORIDE 8 ML: 10 INJECTION, SOLUTION INFILTRATION; PERINEURAL at 16:38

## 2021-10-05 RX ADMIN — METHYLPREDNISOLONE ACETATE 80 MG: 40 INJECTION, SUSPENSION INTRA-ARTICULAR; INTRALESIONAL; INTRAMUSCULAR; SOFT TISSUE at 16:39

## 2021-10-05 NOTE — LETTER
Shoulder Elbow Rehabilitation Referral    Patient Name: Patricia Morataya      YOB: 1965    Diagnosis:   1. Right shoulder pain, unspecified chronicity    2. Nontraumatic tear of right rotator cuff, unspecified tear extent        Precautions: CSI 10/5/2021     Post Op Instructions:  [] Continuous passive motion (CPM)  [x] Elbow range of motion  [x] Exercise in plane of scapula   []  Strengthening     [] Pulley and instruction    [x] Home exercise program (copy to patient)   [] Sling when arm at risk  [] Sling or brace at all times   [] AAROM: Forward elevation to             [] AAROM: External rotation to     [] Isometric external rotator strengthening [] AAROM: internal rotation: up the back  [] Isometric abductor strengthening  [] AAROM: Internal abduction     [] Isometric internal rotator strengthening [] AAROM: cross-body adduction             Stretching:     Strengthening:  [x] Four quadrant (FE, ER, IR, CBA)  [x] Rotator cuff (ER, IR, Abd)  [] Forward Elevation    [] External Rotators     [] External Rotation    [] Internal Rotators  [] Internal Rotation: up/back   [] Abductors     [] Internal Rotation: supine in abduction  [] Flexors  [] Cross-body abduction    [] Extensors  [] Pendulum (FE, Abd/Add, cw/ccw)  [x] Scapular Stabilizers   [] Wall-walking (FE, Abd)    [x] Shoulder shrugs     [] Table slides      [x] Rhomboid pinch  [] Elbow (flex, ext, pron, sup)    [] Lat.  Pull downs     [] Medial epicondylitis program    [] Forward punch   [] Lateral epicondylitis program    [] Internal rotators     [x] Progressive resistive exercises  [] Bench Press        [] Bench press plus  Activities:     [] Lateral pull-downs  [x] Rowing     [] Progressive two-hand supine press  [] Stepper/Exercise bike   [x] Biceps: curls/supination  [] Swimming  [] Water exercises    Modalities: PRN    Return to Sport:  [] Ultrasound     [] Plyometrics  [] Iontophoresis     [] Rhythmic stabilization  [] Moist heat     [] Core strengthening   [] Massage     [] Sports specific program:   [x] Cryotherapy      [] Electrical stimulation     [] Paraffin  [] Whirlpool  [] TENS    [x] Home exercise program (copy to patient). Perform exercises for:   15     minutes    2-3      times/day  [x] Supervised physical therapy  Frequency: []  1x week  [x] 2x week  [] 3x week  [] Other:   Duration: [] 2 weeks   [] 4 weeks  [x] 6 weeks  [] Other:     Additional Instructions:           Chiara Camarillo MD, PhD

## 2021-10-13 NOTE — PROGRESS NOTES
History of Present Illness:  Emerson Ashley presents today for dominant right shoulder pain. In 2016 I repaired his left shoulder and went on to do well. 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 He has been complaining of increased pain in his right shoulder. He denies any discrete injury. He does complain of pain at night. He is on Effient and Eliquis. He cannot take NSAIDs       Medical History:  Patient's medications, allergies, past medical, surgical, social and family histories were reviewed and updated as appropriate. Pertinent items are noted in HPI  Review of systems reviewed from Patient History Form dated on 10/5/21 and available in the patient's chart under the Media tab. Vital Signs: There were no vitals filed for this visit. Constitutional: Obese, in NAD. Right Shoulder Examination:    Inspection:  No deformity. Palpation:  No crepitus. Tender over the cuff. Active Range of Motion: 150 degrees FE but painful above 120 degrees. IR to T10. Passive Range of Motion:  Deferred. Strength:  5/5 ER, IR. 4/5 Champagne toast, 4/5 Nazia    Special Tests:  Positive Nazia    Self assessment questionnaires were completed today. Radiology:     Plain radiographs of the right rshoulder comprising 3 views: true AP, outlet and axillary lateral were obtained and reviewed in the office: No obvious GH arthritis or signs of chronic cuff deficiency. Type II acromion. AC joint arthritis. Impression:  Possible chronic cuff disease vs. Small tear. Assessment :  Emerson Ashley may have a small rotator cuff tear. However, he needs to keep working and he is able to perform his job. We will try and treat this conservatively. Impression:  Encounter Diagnoses   Name Primary?     Right shoulder pain, unspecified chronicity Yes    Nontraumatic tear of right rotator cuff, unspecified tear extent        Office Procedures:  Orders Placed This Encounter   Procedures    XR SHOULDER RIGHT (MIN 2 VIEWS)     Standing Status:   Future     Number of Occurrences:   1     Standing Expiration Date:   10/5/2022    Amb External Referral To Physical Therapy     Referral Priority:   Routine     Referral Type:   Consult for Advice and Opinion     Referral Reason:   Patient Preference     Requested Specialty:   Physical Therapy     Number of Visits Requested:   1    MD ARTHROCENTESIS ASPIR&/INJ MAJOR JT/BURSA W/O US       Treatment Plan: We will prescribe a short course of physical therapy. We will perform a corticosteroid injection to help with his pain. He can resume activities as tolerated. We will see him back in the office in one month. He is in agreement with this plan and all of his questions were answered today. Risks and benefits of a corticosteroid injection were discussed with Viraj Reddy. 80 milligrams of Depo Medrol and 8 CC of 1% lidocaine were injected in the right shoulder subacromial and glenohumeral following chlorhexidine prep. He  tolerated the procedure well with no immediate adverse sequelae after the injection. Chiara Donahue MD, PhD  10/5/2021